# Patient Record
Sex: FEMALE | Race: BLACK OR AFRICAN AMERICAN | NOT HISPANIC OR LATINO | Employment: STUDENT | URBAN - METROPOLITAN AREA
[De-identification: names, ages, dates, MRNs, and addresses within clinical notes are randomized per-mention and may not be internally consistent; named-entity substitution may affect disease eponyms.]

---
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
## 2025-07-28 ENCOUNTER — OFFICE VISIT (OUTPATIENT)
Dept: URGENT CARE | Facility: URGENT CARE | Age: 20
End: 2025-07-28
Payer: COMMERCIAL

## 2025-07-28 VITALS
BODY MASS INDEX: 35.22 KG/M2 | OXYGEN SATURATION: 95 % | RESPIRATION RATE: 18 BRPM | HEART RATE: 109 BPM | HEIGHT: 68 IN | TEMPERATURE: 98 F | SYSTOLIC BLOOD PRESSURE: 134 MMHG | DIASTOLIC BLOOD PRESSURE: 82 MMHG | WEIGHT: 232.4 LBS

## 2025-07-28 DIAGNOSIS — J30.1 ALLERGIC RHINITIS DUE TO POLLEN, UNSPECIFIED SEASONALITY: ICD-10-CM

## 2025-07-28 DIAGNOSIS — J45.31 MILD PERSISTENT ASTHMA WITH ACUTE EXACERBATION: Primary | ICD-10-CM

## 2025-07-28 PROCEDURE — 99204 OFFICE O/P NEW MOD 45 MIN: CPT | Performed by: EMERGENCY MEDICINE

## 2025-07-28 PROCEDURE — 1125F AMNT PAIN NOTED PAIN PRSNT: CPT | Performed by: EMERGENCY MEDICINE

## 2025-07-28 PROCEDURE — 3079F DIAST BP 80-89 MM HG: CPT | Performed by: EMERGENCY MEDICINE

## 2025-07-28 PROCEDURE — 3075F SYST BP GE 130 - 139MM HG: CPT | Performed by: EMERGENCY MEDICINE

## 2025-07-28 RX ORDER — METHYLPREDNISOLONE 4 MG/1
TABLET ORAL
Qty: 21 TABLET | Refills: 0 | Status: SHIPPED | OUTPATIENT
Start: 2025-07-28

## 2025-07-28 RX ORDER — BUDESONIDE AND FORMOTEROL FUMARATE DIHYDRATE 160; 4.5 UG/1; UG/1
2 AEROSOL RESPIRATORY (INHALATION) 2 TIMES DAILY
Qty: 10.2 G | Refills: 0 | Status: SHIPPED | OUTPATIENT
Start: 2025-07-28

## 2025-07-28 ASSESSMENT — PAIN SCALES - GENERAL: PAINLEVEL_OUTOF10: MODERATE PAIN (5)

## 2025-07-28 NOTE — PROGRESS NOTES
Urgent Care Clinic Visit    Chief Complaint   Patient presents with    URI     Dry cough, sore throat, sinus pain, hoarse voice, asthma flaring up - started Saturday, started taking sudafed yesterday                7/28/2025     6:26 PM   Additional Questions   Roomed by Purnima   Accompanied by Mom         7/28/2025   Forms   Any forms needing to be completed Yes     Does the patient have a sore throat and either history of fever >100.4 in the previous 24 hours or recent exposure to a known case of strep throat? No

## 2025-07-28 NOTE — LETTER
July 28, 2025      Marilee PRIEST Rivera  [address]  [place] [facility] [address]        To Whom It May Concern:    Marilee Stafford  was seen on 7/28/2025.  Please excuse her  until 7/29/2025 due to illness.        Sincerely,        Alexander Abel PA-C    Electronically signed

## 2025-07-28 NOTE — PROGRESS NOTES
Assessment & Plan     Diagnosis:    ICD-10-CM    1. Mild persistent asthma with acute exacerbation  J45.31 budesonide-formoterol (SYMBICORT/BREYNA) 160-4.5 MCG/ACT inhaler     methylPREDNISolone (MEDROL DOSEPAK) 4 MG tablet therapy pack      2. Allergic rhinitis due to pollen, unspecified seasonality  J30.1         Medical Decision Making:  Marilee Stafford is a 19 year old female who presents for evaluation of rhinorrhea, post-nasal drip, shortness of breath and wheezing, consistent with known reactive airway disease.  Signs and symptoms are consistent with asthma exacerbation and allergic rhinitis. No chills/fever/myalgias or other signs indicating infection.  A broad differential was considered including inhaled foreign body, asthma, pneumonia, bronchitis, COPD, pneumothorax, cardiac equivalent, viral induced wheezing, allergic phenomena, etc.      No indication for hospitalization at this time including no hypoxia, no marked increase in respiratory rate, no retractions.  She misplaced her Symbicort at home; has not been using this; will send replacement if they cannot locate it. Supportive outpatient management is indicated, medications for discharge noted above.  Close followup with primary care physician.  Go to the ER if increased wheezing, progressive shortness of breath, develops fever greater than 102 F or other concerns.     AMELIA [facility]    Subjective     Marilee Stafford is a 19 year old female who presents to clinic today for the following health issues:  Chief Complaint   Patient presents with    URI     Dry cough, sore throat, sinus pain, hoarse voice, asthma flaring up - started Saturday, started taking sudafed yesterday        HPI  Patient 2 days of runny nose, postnasal drip, slight throat irritation, cough, wheezing and shortness of breath.  She notes Sudafed is helping with her sinus symptoms, throat seems to be getting a little better, does not feel sore  "or painful.  No difficulty swallowing.  She has history of asthma, misplaced her Symbicort but has been using albuterol as needed which seems to help temporarily.  She denies any shortness of breath at rest or with minimal exertion.  chest pain, fevers, body aches, chills, ear pain, sinus pain or pressure, rashes.      Review of Systems    See HPI    Objective      Vitals: /82 (BP Location: Left arm, Patient Position: Sitting, Cuff Size: Adult Regular)   Pulse 109   Temp 98  F (36.7  C) (Tympanic)   Resp 18   Ht 1.727 m (5' 8\")   Wt 105.4 kg (232 lb 6.4 oz)   LMP 06/28/2025 (Approximate)   SpO2 95%   BMI 35.34 kg/m        Patient Vitals for the past 24 hrs:   BP Temp Temp src Pulse Resp SpO2 Height Weight   07/28/25 1825 134/82 98  F (36.7  C) Tympanic 109 18 95 % 1.727 m (5' 8\") 105.4 kg (232 lb 6.4 oz)       Vital signs reviewed by: Alexander Abel PA-C    Physical Exam   Constitutional: Patient is alert and cooperative. No acute distress.  Ears: Right TM is normal. Left TM is normal. External ear canals are normal.  Mouth: Mucous membranes are moist. Normal tongue and tonsil. Posterior oropharynx is clear.  Cardiovascular: Regular rate and rhythm  Pulmonary/Chest: mild expiratory wheezes throughout; no rales or rhonchi.  Effort normal.  No retractions.  Speaking in full sentences, no respiratory distress.  Psychiatric:The patient has a normal mood and affect.     Alexander Abel PA-C, July 28, 2025        "

## 2025-08-03 ENCOUNTER — HEALTH MAINTENANCE LETTER (OUTPATIENT)
Age: 20
End: 2025-08-03